# Patient Record
Sex: FEMALE | Race: OTHER | HISPANIC OR LATINO | URBAN - METROPOLITAN AREA
[De-identification: names, ages, dates, MRNs, and addresses within clinical notes are randomized per-mention and may not be internally consistent; named-entity substitution may affect disease eponyms.]

---

## 2019-03-08 ENCOUNTER — INPATIENT (INPATIENT)
Facility: HOSPITAL | Age: 59
LOS: 1 days | Discharge: HOME | End: 2019-03-10
Attending: STUDENT IN AN ORGANIZED HEALTH CARE EDUCATION/TRAINING PROGRAM | Admitting: STUDENT IN AN ORGANIZED HEALTH CARE EDUCATION/TRAINING PROGRAM
Payer: MEDICARE

## 2019-03-08 VITALS
OXYGEN SATURATION: 95 % | DIASTOLIC BLOOD PRESSURE: 97 MMHG | HEART RATE: 105 BPM | SYSTOLIC BLOOD PRESSURE: 154 MMHG | TEMPERATURE: 98 F | RESPIRATION RATE: 24 BRPM

## 2019-03-08 LAB
ALBUMIN SERPL ELPH-MCNC: 4.5 G/DL — SIGNIFICANT CHANGE UP (ref 3.3–5)
ALP SERPL-CCNC: 89 U/L — SIGNIFICANT CHANGE UP (ref 40–120)
ALT FLD-CCNC: 23 U/L — SIGNIFICANT CHANGE UP (ref 4–33)
ANION GAP SERPL CALC-SCNC: 14 MMO/L — SIGNIFICANT CHANGE UP (ref 7–14)
AST SERPL-CCNC: 28 U/L — SIGNIFICANT CHANGE UP (ref 4–32)
BASE EXCESS BLDV CALC-SCNC: 4.3 MMOL/L — SIGNIFICANT CHANGE UP
BASOPHILS # BLD AUTO: 0.03 K/UL — SIGNIFICANT CHANGE UP (ref 0–0.2)
BASOPHILS NFR BLD AUTO: 0.5 % — SIGNIFICANT CHANGE UP (ref 0–2)
BILIRUB SERPL-MCNC: 0.4 MG/DL — SIGNIFICANT CHANGE UP (ref 0.2–1.2)
BLOOD GAS VENOUS - CREATININE: 0.53 MG/DL — SIGNIFICANT CHANGE UP (ref 0.5–1.3)
BUN SERPL-MCNC: 13 MG/DL — SIGNIFICANT CHANGE UP (ref 7–23)
CALCIUM SERPL-MCNC: 8.7 MG/DL — SIGNIFICANT CHANGE UP (ref 8.4–10.5)
CHLORIDE BLDV-SCNC: 109 MMOL/L — HIGH (ref 96–108)
CHLORIDE SERPL-SCNC: 102 MMOL/L — SIGNIFICANT CHANGE UP (ref 98–107)
CO2 SERPL-SCNC: 27 MMOL/L — SIGNIFICANT CHANGE UP (ref 22–31)
CREAT SERPL-MCNC: 0.61 MG/DL — SIGNIFICANT CHANGE UP (ref 0.5–1.3)
EOSINOPHIL # BLD AUTO: 0.3 K/UL — SIGNIFICANT CHANGE UP (ref 0–0.5)
EOSINOPHIL NFR BLD AUTO: 4.9 % — SIGNIFICANT CHANGE UP (ref 0–6)
GAS PNL BLDV: 140 MMOL/L — SIGNIFICANT CHANGE UP (ref 136–146)
GLUCOSE BLDV-MCNC: 100 — HIGH (ref 70–99)
GLUCOSE SERPL-MCNC: 100 MG/DL — HIGH (ref 70–99)
HCO3 BLDV-SCNC: 28 MMOL/L — HIGH (ref 20–27)
HCT VFR BLD CALC: 40.5 % — SIGNIFICANT CHANGE UP (ref 34.5–45)
HCT VFR BLDV CALC: 42.2 % — SIGNIFICANT CHANGE UP (ref 34.5–45)
HGB BLD-MCNC: 13.1 G/DL — SIGNIFICANT CHANGE UP (ref 11.5–15.5)
HGB BLDV-MCNC: 13.8 G/DL — SIGNIFICANT CHANGE UP (ref 11.5–15.5)
IMM GRANULOCYTES NFR BLD AUTO: 0.3 % — SIGNIFICANT CHANGE UP (ref 0–1.5)
LACTATE BLDV-MCNC: 2.1 MMOL/L — HIGH (ref 0.5–2)
LYMPHOCYTES # BLD AUTO: 1.81 K/UL — SIGNIFICANT CHANGE UP (ref 1–3.3)
LYMPHOCYTES # BLD AUTO: 29.5 % — SIGNIFICANT CHANGE UP (ref 13–44)
MCHC RBC-ENTMCNC: 28.8 PG — SIGNIFICANT CHANGE UP (ref 27–34)
MCHC RBC-ENTMCNC: 32.3 % — SIGNIFICANT CHANGE UP (ref 32–36)
MCV RBC AUTO: 89 FL — SIGNIFICANT CHANGE UP (ref 80–100)
MONOCYTES # BLD AUTO: 0.49 K/UL — SIGNIFICANT CHANGE UP (ref 0–0.9)
MONOCYTES NFR BLD AUTO: 8 % — SIGNIFICANT CHANGE UP (ref 2–14)
NEUTROPHILS # BLD AUTO: 3.48 K/UL — SIGNIFICANT CHANGE UP (ref 1.8–7.4)
NEUTROPHILS NFR BLD AUTO: 56.8 % — SIGNIFICANT CHANGE UP (ref 43–77)
NRBC # FLD: 0 K/UL — LOW (ref 25–125)
PCO2 BLDV: 49 MMHG — SIGNIFICANT CHANGE UP (ref 41–51)
PH BLDV: 7.39 PH — SIGNIFICANT CHANGE UP (ref 7.32–7.43)
PLATELET # BLD AUTO: 173 K/UL — SIGNIFICANT CHANGE UP (ref 150–400)
PMV BLD: 11 FL — SIGNIFICANT CHANGE UP (ref 7–13)
PO2 BLDV: 85 MMHG — HIGH (ref 35–40)
POTASSIUM BLDV-SCNC: 3.7 MMOL/L — SIGNIFICANT CHANGE UP (ref 3.4–4.5)
POTASSIUM SERPL-MCNC: 3.8 MMOL/L — SIGNIFICANT CHANGE UP (ref 3.5–5.3)
POTASSIUM SERPL-SCNC: 3.8 MMOL/L — SIGNIFICANT CHANGE UP (ref 3.5–5.3)
PROT SERPL-MCNC: 7.9 G/DL — SIGNIFICANT CHANGE UP (ref 6–8.3)
RBC # BLD: 4.55 M/UL — SIGNIFICANT CHANGE UP (ref 3.8–5.2)
RBC # FLD: 13.2 % — SIGNIFICANT CHANGE UP (ref 10.3–14.5)
SAO2 % BLDV: 95.2 % — HIGH (ref 60–85)
SODIUM SERPL-SCNC: 143 MMOL/L — SIGNIFICANT CHANGE UP (ref 135–145)
TROPONIN T, HIGH SENSITIVITY: 7 NG/L — SIGNIFICANT CHANGE UP (ref ?–14)
WBC # BLD: 6.13 K/UL — SIGNIFICANT CHANGE UP (ref 3.8–10.5)
WBC # FLD AUTO: 6.13 K/UL — SIGNIFICANT CHANGE UP (ref 3.8–10.5)

## 2019-03-08 PROCEDURE — 71046 X-RAY EXAM CHEST 2 VIEWS: CPT | Mod: 26

## 2019-03-08 RX ORDER — IPRATROPIUM/ALBUTEROL SULFATE 18-103MCG
3 AEROSOL WITH ADAPTER (GRAM) INHALATION ONCE
Qty: 0 | Refills: 0 | Status: COMPLETED | OUTPATIENT
Start: 2019-03-08 | End: 2019-03-08

## 2019-03-08 RX ORDER — SODIUM CHLORIDE 9 MG/ML
1000 INJECTION INTRAMUSCULAR; INTRAVENOUS; SUBCUTANEOUS ONCE
Qty: 0 | Refills: 0 | Status: COMPLETED | OUTPATIENT
Start: 2019-03-08 | End: 2019-03-08

## 2019-03-08 RX ORDER — ALBUTEROL 90 UG/1
1 AEROSOL, METERED ORAL EVERY 4 HOURS
Qty: 0 | Refills: 0 | Status: DISCONTINUED | OUTPATIENT
Start: 2019-03-08 | End: 2019-03-09

## 2019-03-08 RX ORDER — TIOTROPIUM BROMIDE 18 UG/1
1 CAPSULE ORAL; RESPIRATORY (INHALATION) DAILY
Qty: 0 | Refills: 0 | Status: DISCONTINUED | OUTPATIENT
Start: 2019-03-08 | End: 2019-03-10

## 2019-03-08 RX ORDER — ONDANSETRON 8 MG/1
4 TABLET, FILM COATED ORAL ONCE
Qty: 0 | Refills: 0 | Status: COMPLETED | OUTPATIENT
Start: 2019-03-08 | End: 2019-03-08

## 2019-03-08 RX ADMIN — Medication 3 MILLILITER(S): at 23:04

## 2019-03-08 NOTE — ED ADULT TRIAGE NOTE - CHIEF COMPLAINT QUOTE
Presents to ED for vomiting.  Patient was treated and released from [facility] today for suicidal thoughts and syncope.  Per daughter, patient was just released from [facility] and immediately brought to [facility] for vomiting, nausea , weakness and left sided chest pain.  Denies SI/HI at this time.  History of depression, ETOH abuse and Asthma.  She consumed 1/2 pint of Bacardi today.  She received weekly injections of Vivitrol.  Appears very uncomfortable and actively vomiting in triage. Presents to ED for SOB and vomiting.  Patient was treated and released from [facility] today for suicidal thoughts and syncope.  Per daughter, patient was just released from [facility] and immediately brought to [facility] for SOB, vomiting, nausea , weakness and left sided chest pain.  Wheezing noted.  Respirations equal but labored and unable to speak in full sentences.  She has been experiencing severe Asthma sx daily without relief with medications x 1wk.  Denies SI/HI at this time.  History of depression, ETOH abuse and Asthma.  She consumed 1/2 pint of Bacardi today and endorse consuming ETOH frequently.  She received weekly injections of Vivitrol.  Appears very uncomfortable and actively vomiting in triage.

## 2019-03-08 NOTE — ED PROVIDER NOTE - CLINICAL SUMMARY MEDICAL DECISION MAKING FREE TEXT BOX
GEOVANNYCANDIE: 59 y/o female BIB her daughter after being discharged from [place] for alcohol intoxication, pt has long standing hx of ETOH abuse, Depression with SI attempts, states that she is on vivitrol but has relapsed past week, drinking everyday does not remember last drink, pt with hx of asthma as well never been intubated but has been hospitalized multiple times for it, today pt syncopized at family's house, pt c/o CP, pt denies drug abuse or taking Xanax, denies SI/HI, on exam pt with swollen eyes, no visual abnormalities, exp wheezing b/l, able to speak, no signs of trauma, pt anxious, tearful,   concern of asthma exacerbation, alcohol withdrawal, syncope/chest pain...1)tele 2)CBC, CMP, CE, VBG, serum tox 3)CXR 4)CIWA score/medicate benzos 5)IVF 6)Duonebs 7)Admit

## 2019-03-08 NOTE — ED PROVIDER NOTE - PROGRESS NOTE DETAILS
Annia Black, resident MD: pt has improvement of wheezing after 1 dose of duoneb but continues to have scattered wheezing. will give another dose of duoneb. pt reports nausea and shaking, concern for withdrawal, will give a dose of ativan at this time. Annia Black, resident MD: pt resting comfortably at this time with no evidence of tremulousness and improvement of palpitations. will admit for concerns of alcohol withdrawal and syncope workup

## 2019-03-08 NOTE — ED PROVIDER NOTE - CARE PLAN
Principal Discharge DX:	ETOH abuse Principal Discharge DX:	ETOH abuse  Secondary Diagnosis:	Asthma  Secondary Diagnosis:	Syncope  Secondary Diagnosis:	Alcohol withdrawal

## 2019-03-08 NOTE — ED PROVIDER NOTE - OBJECTIVE STATEMENT
59 yo woman PMH EtOH abuse, asthma, HTN, depression, CVA with no residual symptoms presents after an episode of loss of consciousness. Pt was at home and daughter was called 57 yo woman PMH EtOH abuse, asthma, HTN, depression, CVA with no residual symptoms presents after an episode of loss of consciousness. Pt was at home and daughter was called as pt had suicidal ideations, she went over to pt's cousin's house where she had an episode of loss of consciousness and EMS was called and pt was taken to [facility]. She was discharged from the ED but pt became nauseous after discharge and was brought to this ED by daughter. Pt also complains of left chest pressure that radiates to her arm as well as SOB consistent with asthma exacerbation. Pt was recently in rehab for EtOH abuse x 1 month and has been on vivitrol but relapsed this past Saturday and had her last drink this morning. Pt denies polysubstance use. Pt denies history of withdrawal or seizures due to EtOH use. 59 yo woman PMH EtOH abuse, asthma, HTN, depression, CVA with no residual symptoms presents after an episode of loss of consciousness. Pt's daughter was called by pt's cousin as pt had suicidal ideations. She had been drinking in the morning and went over to pt's cousin's house where she had an episode of loss of consciousness and EMS was called and pt was taken to [facility]. She was discharged from the ED but pt became nauseous after discharge and was brought to this ED by daughter. Pt currently denies suicidal ideations and reports that she has never had any thoughts of suicide as she is [employer]. Pt also complains of left chest pressure that radiates to her arm as well palpitation and SOB consistent with asthma exacerbation. Pt was recently in rehab for EtOH abuse x 1 month and has been on vivitrol but relapsed this past Saturday and had her last drink this morning. Pt denies polysubstance use. Pt denies history of withdrawal or seizures due to EtOH use. Pt has had previous hospitalizations for asthma but no history of intubations.

## 2019-03-08 NOTE — ED PROVIDER NOTE - NS ED ROS FT
General: no fevers or chills  Head: no headache  Eyes: no vision change  ENT: no neck pain  CV: + left chest pain, + palpitations  Resp: + SOB, no cough  GI: +N/V, no hematemesis  : no dysuria  MSK: +left arm pain  Skin: no new rash  Neuro: no focal weakness, no change in sensation General: no fevers or chills  Head: no headache  Eyes: no vision change  ENT: no neck pain  CV: + left chest pain, + palpitations  Resp: + SOB, +cough  GI: +N/V, no hematemesis  : no dysuria  MSK: +left arm pain  Skin: no new rash  Neuro: no focal weakness, no change in sensation

## 2019-03-08 NOTE — ED PROVIDER NOTE - ATTENDING CONTRIBUTION TO CARE
Attending MD GOULD:  I personally have seen and examined this patient.  Resident note reviewed and agree on plan of care and except where noted.  See MDM for details.

## 2019-03-08 NOTE — ED ADULT NURSE NOTE - CHIEF COMPLAINT QUOTE
Presents to ED for SOB and vomiting.  Patient was treated and released from [facility] today for suicidal thoughts and syncope.  Per daughter, patient was just released from [facility] and immediately brought to [facility] for SOB, vomiting, nausea , weakness and left sided chest pain.  Wheezing noted.  Respirations equal but labored and unable to speak in full sentences.  She has been experiencing severe Asthma sx daily without relief with medications x 1wk.  Denies SI/HI at this time.  History of depression, ETOH abuse and Asthma.  She consumed 1/2 pint of Bacardi today and endorse consuming ETOH frequently.  She received weekly injections of Vivitrol.  Appears very uncomfortable and actively vomiting in triage.

## 2019-03-08 NOTE — ED PROVIDER NOTE - PHYSICAL EXAMINATION
General: anxious-appearing woman in mild to moderate respiratory distress able to speak in sentences  Head: normocephalic, atraumatic  Eyes: PERRL  Mouth: moist mucous membranes  Neck: supple neck, no Cspine tenderness to palpation  CV: normal rate and rhythm at time of exam, normal S1 and S2  Respiratory: wheezing diffusely bilaterally  Abdomen: soft, nontender, nondistended  Back: no midline tenderness to palpation, no CVAT  Neuro: alert and oriented x3, speech clear, moving all extremities spontaneously  Skin: no rash  Extremities: no edema, peripheral pulses 2+ bilaterally

## 2019-03-08 NOTE — ED ADULT NURSE NOTE - OBJECTIVE STATEMENT
Pt is a 58 year old female reporting to the ED for SOB, N/V. Pt reports she was seen at [facility] this morning for fall after drinking 1/2 pint of Bacardi and SI. Pt reports PMh of asthma, Depression and anxiety. Pt reports increased SOB for 1 week. Pt reports medication is no longer helping her SOB. Pt is wheezing, respirations are tachypneic. Pt reports increase n/v after leaving [facility]. Pt reports receiving Vivitrol injections weekly. PT reports increased anxiety and depression. Pt is AOX4, lethargic. Pt reports left side chest pain. Pt deneis fever, chills. MD at bedside, will continue to monitor Pt is a 58 year old female reporting to the ED for SOB, N/V. Pt reports she was seen at [facility] this morning for fall after drinking 1/2 pint of Bacardi and SI. Pt reports PMh of asthma, Depression and anxiety. Pt reports increased SOB for 1 week. Pt reports medication is no longer helping her SOB. Pt is wheezing, respirations are tachypneic. Pt reports increase n/v after leaving [place]. Pt reports receiving Vivitrol injections weekly. PT reports increased anxiety and depression. Pt is AOX4, lethargic. Pt denies SI/HI. Pt reports left side chest pain. Pt deneis fever, chills. MD at bedside, will continue to monitor

## 2019-03-09 DIAGNOSIS — R07.9 CHEST PAIN, UNSPECIFIED: ICD-10-CM

## 2019-03-09 DIAGNOSIS — F10.10 ALCOHOL ABUSE, UNCOMPLICATED: ICD-10-CM

## 2019-03-09 DIAGNOSIS — J45.909 UNSPECIFIED ASTHMA, UNCOMPLICATED: ICD-10-CM

## 2019-03-09 DIAGNOSIS — I10 ESSENTIAL (PRIMARY) HYPERTENSION: ICD-10-CM

## 2019-03-09 DIAGNOSIS — F10.239 ALCOHOL DEPENDENCE WITH WITHDRAWAL, UNSPECIFIED: ICD-10-CM

## 2019-03-09 LAB
AMPHET UR-MCNC: NEGATIVE — SIGNIFICANT CHANGE UP
APAP SERPL-MCNC: < 15 UG/ML — LOW (ref 15–25)
BARBITURATES UR SCN-MCNC: NEGATIVE — SIGNIFICANT CHANGE UP
BENZODIAZ UR-MCNC: NEGATIVE — SIGNIFICANT CHANGE UP
CANNABINOIDS UR-MCNC: NEGATIVE — SIGNIFICANT CHANGE UP
COCAINE METAB.OTHER UR-MCNC: NEGATIVE — SIGNIFICANT CHANGE UP
ETHANOL BLD-MCNC: 94 MG/DL — HIGH
MAGNESIUM SERPL-MCNC: 1.7 MG/DL — SIGNIFICANT CHANGE UP (ref 1.6–2.6)
METHADONE UR-MCNC: NEGATIVE — SIGNIFICANT CHANGE UP
OPIATES UR-MCNC: NEGATIVE — SIGNIFICANT CHANGE UP
OXYCODONE UR-MCNC: NEGATIVE — SIGNIFICANT CHANGE UP
PCP UR-MCNC: NEGATIVE — SIGNIFICANT CHANGE UP
PHOSPHATE SERPL-MCNC: 3 MG/DL — SIGNIFICANT CHANGE UP (ref 2.5–4.5)
SALICYLATES SERPL-MCNC: < 5 MG/DL — LOW (ref 15–30)
TROPONIN T, HIGH SENSITIVITY: 6 NG/L — SIGNIFICANT CHANGE UP (ref ?–14)

## 2019-03-09 PROCEDURE — 99223 1ST HOSP IP/OBS HIGH 75: CPT

## 2019-03-09 RX ORDER — DIAZEPAM 5 MG
5 TABLET ORAL EVERY 8 HOURS
Qty: 0 | Refills: 0 | Status: DISCONTINUED | OUTPATIENT
Start: 2019-03-09 | End: 2019-03-09

## 2019-03-09 RX ORDER — ACETAMINOPHEN 500 MG
650 TABLET ORAL ONCE
Qty: 0 | Refills: 0 | Status: DISCONTINUED | OUTPATIENT
Start: 2019-03-09 | End: 2019-03-10

## 2019-03-09 RX ORDER — MONTELUKAST 4 MG/1
10 TABLET, CHEWABLE ORAL DAILY
Qty: 0 | Refills: 0 | Status: DISCONTINUED | OUTPATIENT
Start: 2019-03-09 | End: 2019-03-10

## 2019-03-09 RX ORDER — ALBUTEROL 90 UG/1
2.5 AEROSOL, METERED ORAL EVERY 6 HOURS
Qty: 0 | Refills: 0 | Status: DISCONTINUED | OUTPATIENT
Start: 2019-03-09 | End: 2019-03-10

## 2019-03-09 RX ORDER — HEPARIN SODIUM 5000 [USP'U]/ML
5000 INJECTION INTRAVENOUS; SUBCUTANEOUS EVERY 8 HOURS
Qty: 0 | Refills: 0 | Status: DISCONTINUED | OUTPATIENT
Start: 2019-03-09 | End: 2019-03-10

## 2019-03-09 RX ORDER — ALBUTEROL 90 UG/1
1 AEROSOL, METERED ORAL EVERY 4 HOURS
Qty: 0 | Refills: 0 | Status: DISCONTINUED | OUTPATIENT
Start: 2019-03-09 | End: 2019-03-10

## 2019-03-09 RX ORDER — IPRATROPIUM/ALBUTEROL SULFATE 18-103MCG
3 AEROSOL WITH ADAPTER (GRAM) INHALATION ONCE
Qty: 0 | Refills: 0 | Status: COMPLETED | OUTPATIENT
Start: 2019-03-09 | End: 2019-03-09

## 2019-03-09 RX ORDER — HYDROCHLOROTHIAZIDE 25 MG
25 TABLET ORAL DAILY
Qty: 0 | Refills: 0 | Status: DISCONTINUED | OUTPATIENT
Start: 2019-03-09 | End: 2019-03-10

## 2019-03-09 RX ORDER — NIFEDIPINE 30 MG
30 TABLET, EXTENDED RELEASE 24 HR ORAL DAILY
Qty: 0 | Refills: 0 | Status: DISCONTINUED | OUTPATIENT
Start: 2019-03-09 | End: 2019-03-10

## 2019-03-09 RX ADMIN — MONTELUKAST 10 MILLIGRAM(S): 4 TABLET, CHEWABLE ORAL at 11:41

## 2019-03-09 RX ADMIN — Medication 3 MILLILITER(S): at 05:17

## 2019-03-09 RX ADMIN — Medication 3 MILLILITER(S): at 00:07

## 2019-03-09 RX ADMIN — ONDANSETRON 4 MILLIGRAM(S): 8 TABLET, FILM COATED ORAL at 00:07

## 2019-03-09 RX ADMIN — ALBUTEROL 2.5 MILLIGRAM(S): 90 AEROSOL, METERED ORAL at 11:43

## 2019-03-09 RX ADMIN — Medication 30 MILLIGRAM(S): at 11:41

## 2019-03-09 RX ADMIN — ALBUTEROL 2.5 MILLIGRAM(S): 90 AEROSOL, METERED ORAL at 21:51

## 2019-03-09 RX ADMIN — ALBUTEROL 2.5 MILLIGRAM(S): 90 AEROSOL, METERED ORAL at 18:00

## 2019-03-09 RX ADMIN — Medication 1 MILLIGRAM(S): at 00:07

## 2019-03-09 RX ADMIN — SODIUM CHLORIDE 2000 MILLILITER(S): 9 INJECTION INTRAMUSCULAR; INTRAVENOUS; SUBCUTANEOUS at 00:08

## 2019-03-09 RX ADMIN — Medication 25 MILLIGRAM(S): at 11:41

## 2019-03-09 NOTE — H&P ADULT - ATTENDING COMMENTS
58F with PMHx anxiety, asthma, etoh abuse presented after episode of syncope related to EtOH abuse at home. Also reporting anxiety attack with substernal chest pain which now resolved. No ST wave changes on EKG, trop neg x2. Syncope likely related to EtOH use, though could not rule-out cardiac cause, no murmurs heard on exam. c/w tele monitor x24hrs. TTE. no need for stress test. Asthma, mild persistent, c/w Symbicort and singular, albuterol prn. EtOH abuse, monitor CIWA, c/w home med for anxiety, need outpatient psych follow up. BZD prn for etoh withdrawal. if not tele event overnight and does not go into acute etoh withdrawal, can be d/c in AM.

## 2019-03-09 NOTE — ED ADULT NURSE REASSESSMENT NOTE - NS ED NURSE REASSESS COMMENT FT1
Pt is AOX4. Pt is wheezing on inspiration and expiration. Pt receiving duoneb treatment, will continue to monitor

## 2019-03-09 NOTE — H&P ADULT - HISTORY OF PRESENT ILLNESS
This is a 59 yo F c/o nausea after going on a binge drinking episode last night . She passed out and EMS was called and took her to [facility], she remembers being taken to the ambulance and was feeling CP at that time states she normally gets CP when she gets panic attacks, and she felt as if she was having a panic attack in the ambulance. The CP was substernal non radiating. She often has CP at night in bed when she becomes anxious or has a panic attack, she normally takes diazepam but was recently started on xanax by her psychiatrist. Patient denies any SOB, MCCORMICK, orthopnea, no CP with exertion.   	She had about a half a bottle of rum yesterday, prior she was doing well was on ETOH rehab and went 1 month without a drink and was on vivitrol.  She  use to have 2 episodes of binge drinking a month. She has no intention to hurt herself or others, no suicidal thoughts or ideation. She was discharged from [facility] and  was nausea and had an episode of vomitting so her daughter brought her here straight from [facility]. This is a 59 yo F c/o nausea after going on a binge drinking episode last night . She passed out and EMS was called and took her to [facility], she remembers being taken to the ambulance and was feeling CP at that time states she normally gets CP when she gets panic attacks, and she felt as if she was having a panic attack in the ambulance. The CP was substernal non radiating. She often has CP at night in bed when she becomes anxious or has a panic attack, she normally takes diazepam but was recently started on xanax by her psychiatrist. Patient denies any SOB, MCCORMICK, orthopnea, no CP with exertion.   	She had about a half a bottle of rum yesterday, prior she was doing well was on ETOH rehab and went 1 month without a drink and was on vivitrol.  She  use to have 2 episodes of binge drinking a month. She has no intention to hurt herself or others, no suicidal thoughts or ideation. She was discharged from [place] and  was nausea and had an episode of vomitting so her daughter brought her here straight from [facility]. No fever, chills, no abd pain.

## 2019-03-09 NOTE — H&P ADULT - NSHPLABSRESULTS_GEN_ALL_CORE
13.1   6.13  )-----------( 173      ( 08 Mar 2019 22:50 )             40.5   03-08    143  |  102  |  13  ----------------------------<  100<H>  3.8   |  27  |  0.61    Ca    8.7      08 Mar 2019 22:50  Phos  3.0     03-08  Mg     1.7     03-08    TPro  7.9  /  Alb  4.5  /  TBili  0.4  /  DBili  x   /  AST  28  /  ALT  23  /  AlkPhos  89  03-08

## 2019-03-10 VITALS
TEMPERATURE: 98 F | DIASTOLIC BLOOD PRESSURE: 67 MMHG | SYSTOLIC BLOOD PRESSURE: 112 MMHG | HEART RATE: 78 BPM | OXYGEN SATURATION: 98 % | RESPIRATION RATE: 18 BRPM

## 2019-03-10 DIAGNOSIS — F41.9 ANXIETY DISORDER, UNSPECIFIED: ICD-10-CM

## 2019-03-10 DIAGNOSIS — M54.30 SCIATICA, UNSPECIFIED SIDE: ICD-10-CM

## 2019-03-10 LAB
ANION GAP SERPL CALC-SCNC: 15 MMO/L — HIGH (ref 7–14)
BUN SERPL-MCNC: 12 MG/DL — SIGNIFICANT CHANGE UP (ref 7–23)
CALCIUM SERPL-MCNC: 9.8 MG/DL — SIGNIFICANT CHANGE UP (ref 8.4–10.5)
CHLORIDE SERPL-SCNC: 97 MMOL/L — LOW (ref 98–107)
CO2 SERPL-SCNC: 27 MMOL/L — SIGNIFICANT CHANGE UP (ref 22–31)
CREAT SERPL-MCNC: 0.65 MG/DL — SIGNIFICANT CHANGE UP (ref 0.5–1.3)
GLUCOSE SERPL-MCNC: 102 MG/DL — HIGH (ref 70–99)
HCT VFR BLD CALC: 42.4 % — SIGNIFICANT CHANGE UP (ref 34.5–45)
HGB BLD-MCNC: 13.9 G/DL — SIGNIFICANT CHANGE UP (ref 11.5–15.5)
MAGNESIUM SERPL-MCNC: 1.9 MG/DL — SIGNIFICANT CHANGE UP (ref 1.6–2.6)
MCHC RBC-ENTMCNC: 28.8 PG — SIGNIFICANT CHANGE UP (ref 27–34)
MCHC RBC-ENTMCNC: 32.8 % — SIGNIFICANT CHANGE UP (ref 32–36)
MCV RBC AUTO: 88 FL — SIGNIFICANT CHANGE UP (ref 80–100)
NRBC # FLD: 0 K/UL — LOW (ref 25–125)
PLATELET # BLD AUTO: 150 K/UL — SIGNIFICANT CHANGE UP (ref 150–400)
PMV BLD: 11.4 FL — SIGNIFICANT CHANGE UP (ref 7–13)
POTASSIUM SERPL-MCNC: 3.5 MMOL/L — SIGNIFICANT CHANGE UP (ref 3.5–5.3)
POTASSIUM SERPL-SCNC: 3.5 MMOL/L — SIGNIFICANT CHANGE UP (ref 3.5–5.3)
RBC # BLD: 4.82 M/UL — SIGNIFICANT CHANGE UP (ref 3.8–5.2)
RBC # FLD: 13 % — SIGNIFICANT CHANGE UP (ref 10.3–14.5)
SODIUM SERPL-SCNC: 139 MMOL/L — SIGNIFICANT CHANGE UP (ref 135–145)
WBC # BLD: 5.33 K/UL — SIGNIFICANT CHANGE UP (ref 3.8–10.5)
WBC # FLD AUTO: 5.33 K/UL — SIGNIFICANT CHANGE UP (ref 3.8–10.5)

## 2019-03-10 PROCEDURE — 99239 HOSP IP/OBS DSCHRG MGMT >30: CPT

## 2019-03-10 RX ORDER — ACETAMINOPHEN 500 MG
2 TABLET ORAL
Qty: 0 | Refills: 0 | COMMUNITY
Start: 2019-03-10

## 2019-03-10 RX ORDER — ESCITALOPRAM OXALATE 10 MG/1
1 TABLET, FILM COATED ORAL
Qty: 3 | Refills: 0 | OUTPATIENT
Start: 2019-03-10 | End: 2019-03-12

## 2019-03-10 RX ORDER — NIFEDIPINE 30 MG
30 TABLET, EXTENDED RELEASE 24 HR ORAL
Qty: 900 | Refills: 0 | OUTPATIENT
Start: 2019-03-10 | End: 2019-04-08

## 2019-03-10 RX ORDER — ALPRAZOLAM 0.25 MG
1 TABLET ORAL
Qty: 3 | Refills: 0 | OUTPATIENT
Start: 2019-03-10 | End: 2019-03-10

## 2019-03-10 RX ORDER — MONTELUKAST 4 MG/1
1 TABLET, CHEWABLE ORAL
Qty: 0 | Refills: 0 | COMMUNITY
Start: 2019-03-10

## 2019-03-10 RX ORDER — ALBUTEROL 90 UG/1
90 AEROSOL, METERED ORAL
Qty: 0 | Refills: 0 | COMMUNITY

## 2019-03-10 RX ORDER — ESCITALOPRAM OXALATE 10 MG/1
1 TABLET, FILM COATED ORAL
Qty: 0 | Refills: 0 | COMMUNITY

## 2019-03-10 RX ORDER — ALPRAZOLAM 0.25 MG
1 TABLET ORAL
Qty: 0 | Refills: 0 | COMMUNITY

## 2019-03-10 RX ADMIN — ALBUTEROL 2.5 MILLIGRAM(S): 90 AEROSOL, METERED ORAL at 04:48

## 2019-03-10 RX ADMIN — Medication 25 MILLIGRAM(S): at 05:31

## 2019-03-10 RX ADMIN — ALBUTEROL 2.5 MILLIGRAM(S): 90 AEROSOL, METERED ORAL at 16:46

## 2019-03-10 RX ADMIN — ALBUTEROL 2.5 MILLIGRAM(S): 90 AEROSOL, METERED ORAL at 10:17

## 2019-03-10 RX ADMIN — Medication 30 MILLIGRAM(S): at 05:31

## 2019-03-10 RX ADMIN — MONTELUKAST 10 MILLIGRAM(S): 4 TABLET, CHEWABLE ORAL at 09:22

## 2019-03-10 NOTE — H&P ADULT - [facility] HE PE MLT D E PC
Nausea, vomiting, diarrhea / discharge instructions    -Drink plenty of fluids. Drink sips of the oral dehydration every 5 minutes until urination becomes normal. (It's normal to urinate four or five times a day.) Adults and large children should drink at least 3 quarts or litres of oral fluids a day until they are well.    -Eat foods that are dry such as toast, crackers, or rice. Other diet recommendations: Burke diet, apple sauce, bananas, cereal, oatmeal, soups ok, no milk products until symptoms resolve. -Your symptoms should be resolved in 2-3 days    -Monitor for fevers, chills, increase in abdominal pain, or blood in stool.      -Follow up with your primary provider in 3-5 days or sooner as needed. regular rate and rhythm

## 2019-03-10 NOTE — PROGRESS NOTE ADULT - PROBLEM SELECTOR PLAN 1
likely from panic attack and/or dyspepsia for alcohol binge. No signs or symptoms of heart failure noted. EKG without findings c/w ischemic heart disease  - no need for further inpatient cardiac w/u at this time  - can f/u with outpatient PMD

## 2019-03-10 NOTE — PROGRESS NOTE ADULT - PROBLEM SELECTOR PLAN 6
- continue home regimen of gabapentin    DISPO: Patient is medically stable to be discharged home today with close outpatient f/u. Spent 35 min coordinating discharge plan and counseling patient on her condition and care.

## 2019-03-10 NOTE — DISCHARGE NOTE PROVIDER - CARE PROVIDER_API CALL
Your PMD,   Phone: (   )    -  Fax: (   )    -  Follow Up Time: Your PMD,   Phone: (   )    -  Fax: (   )    -  Follow Up Time:     Yoana Bennett  Phone: [phone]  Fax: (   )    -  Follow Up Time:

## 2019-03-10 NOTE — PROGRESS NOTE ADULT - PROBLEM SELECTOR PLAN 2
Not in active alcohol withdrawal. Patient admits to relapse due to psychosocial factors. Agreeable to f/u with her outpatient psychiatrist.

## 2019-03-10 NOTE — PROGRESS NOTE ADULT - SUBJECTIVE AND OBJECTIVE BOX
Patient is a 58y old  Female who presents with a chief complaint of nausea and CP (10 Mar 2019 11:50)        SUBJECTIVE / OVERNIGHT EVENTS:      MEDICATIONS  (STANDING):  ALBUTerol    0.083% 2.5 milliGRAM(s) Nebulizer every 6 hours  ALBUTerol    90 MICROgram(s) HFA Inhaler 1 Puff(s) Inhalation every 4 hours  heparin  Injectable 5000 Unit(s) SubCutaneous every 8 hours  hydrochlorothiazide 25 milliGRAM(s) Oral daily  montelukast 10 milliGRAM(s) Oral daily  NIFEdipine XL 30 milliGRAM(s) Oral daily  tiotropium 18 MICROgram(s) Capsule 1 Capsule(s) Inhalation daily    MEDICATIONS  (PRN):  acetaminophen   Tablet .. 650 milliGRAM(s) Oral once PRN Moderate Pain (4 - 6), Severe Pain (7 - 10)  LORazepam     Tablet 0.5 milliGRAM(s) Oral three times a day PRN Anxiety      Vital Signs Last 24 Hrs  T(C): 36.7 (10 Mar 2019 12:00), Max: 36.9 (09 Mar 2019 19:05)  T(F): 98 (10 Mar 2019 12:00), Max: 98.5 (09 Mar 2019 19:05)  HR: 70 (10 Mar 2019 12:00) (66 - 95)  BP: 114/89 (10 Mar 2019 12:00) (114/89 - 153/87)  BP(mean): --  RR: 18 (10 Mar 2019 12:00) (16 - 18)  SpO2: 98% (10 Mar 2019 12:00) (96% - 100%)  CAPILLARY BLOOD GLUCOSE        I&O's Summary    09 Mar 2019 06:01  -  10 Mar 2019 07:00  --------------------------------------------------------  IN: 400 mL / OUT: 0 mL / NET: 400 mL    10 Mar 2019 07:01  -  10 Mar 2019 16:13  --------------------------------------------------------  IN: 600 mL / OUT: 0 mL / NET: 600 mL          PHYSICAL EXAM  GENERAL: NAD, well-developed  HEAD:  Atraumatic, Normocephalic  EYES: EOMI, PERRLA, conjunctiva and sclera clear  NECK: Supple, No JVD  CHEST/LUNG: Clear to auscultation bilaterally; No wheeze  HEART: Regular rate and rhythm; No murmurs, rubs, or gallops  ABDOMEN: Soft, Nontender, Nondistended; Bowel sounds present  EXTREMITIES:  2+ Peripheral Pulses, No clubbing, cyanosis, or edema  PSYCH: AAOx3  SKIN: No rashes or lesions    LABS:                        13.9   5.33  )-----------( 150      ( 10 Mar 2019 07:16 )             42.4     03-10    139  |  97<L>  |  12  ----------------------------<  102<H>  3.5   |  27  |  0.65    Ca    9.8      10 Mar 2019 07:16  Phos  3.0     03-08  Mg     1.9     03-10    TPro  7.9  /  Alb  4.5  /  TBili  0.4  /  DBili  x   /  AST  28  /  ALT  23  /  AlkPhos  89  03-08              RADIOLOGY & ADDITIONAL TESTS:    Imaging Personally Reviewed:  Consultant(s) Notes Reviewed:    Care Discussed with Consultants/Other Providers: Patient is a 58y old  Female who presents with a chief complaint of nausea and CP (10 Mar 2019 11:50)    SUBJECTIVE / OVERNIGHT EVENTS:  No events overnight. Patient is doing well but anxious, particularly after hearing "code blue" happening on the floor. She denies current chest pain or chest pain on exertion. She has no SOB, abdominal pain, or LE swelling. CIWA score 0 -2 while inpatient.     MEDICATIONS  (STANDING):  ALBUTerol    0.083% 2.5 milliGRAM(s) Nebulizer every 6 hours  ALBUTerol    90 MICROgram(s) HFA Inhaler 1 Puff(s) Inhalation every 4 hours  heparin  Injectable 5000 Unit(s) SubCutaneous every 8 hours  hydrochlorothiazide 25 milliGRAM(s) Oral daily  montelukast 10 milliGRAM(s) Oral daily  NIFEdipine XL 30 milliGRAM(s) Oral daily  tiotropium 18 MICROgram(s) Capsule 1 Capsule(s) Inhalation daily    MEDICATIONS  (PRN):  acetaminophen   Tablet .. 650 milliGRAM(s) Oral once PRN Moderate Pain (4 - 6), Severe Pain (7 - 10)  LORazepam     Tablet 0.5 milliGRAM(s) Oral three times a day PRN Anxiety      Vital Signs Last 24 Hrs  T(C): 36.7 (10 Mar 2019 12:00), Max: 36.9 (09 Mar 2019 19:05)  T(F): 98 (10 Mar 2019 12:00), Max: 98.5 (09 Mar 2019 19:05)  HR: 70 (10 Mar 2019 12:00) (66 - 95)  BP: 114/89 (10 Mar 2019 12:00) (114/89 - 153/87)  BP(mean): --  RR: 18 (10 Mar 2019 12:00) (16 - 18)  SpO2: 98% (10 Mar 2019 12:00) (96% - 100%)        I&O's Summary    09 Mar 2019 06:01  -  10 Mar 2019 07:00  --------------------------------------------------------  IN: 400 mL / OUT: 0 mL / NET: 400 mL    10 Mar 2019 07:01  -  10 Mar 2019 16:13  --------------------------------------------------------  IN: 600 mL / OUT: 0 mL / NET: 600 mL          PHYSICAL EXAM  GENERAL: NAD, well-developed  CHEST/LUNG: Clear to auscultation bilaterally; No wheeze  HEART: Regular rate and rhythm; No murmurs, rubs, or gallops  ABDOMEN: Soft, Nontender, Nondistended; Bowel sounds present  EXTREMITIES:  2+ Peripheral Pulses, No clubbing, cyanosis, or edema  PSYCH: AAOx3, anxious but pleasant.       LABS:                        13.9   5.33  )-----------( 150      ( 10 Mar 2019 07:16 )             42.4     03-10    139  |  97<L>  |  12  ----------------------------<  102<H>  3.5   |  27  |  0.65    Ca    9.8      10 Mar 2019 07:16  Mg     1.9     03-10      Consultant(s) Notes Reviewed:    Care Discussed with Consultants/Other Providers:

## 2019-03-10 NOTE — DISCHARGE NOTE PROVIDER - NSDCCPCAREPLAN_GEN_ALL_CORE_FT
PRINCIPAL DISCHARGE DIAGNOSIS  Problem: Chest pain  Assessment and Plan of Treatment: Likely secondary to anxiety  Follow up with Psych as outpatient      SECONDARY DISCHARGE DIAGNOSES  Problem: ETOH abuse  Assessment and Plan of Treatment: Attend alcoholism treatment program, practice the Twelve Steps of AA.  Go to meetings to help you cope with uncomfortable feelings, and to help you develop a relapse prevention plan to prevent complications associated with alcohol intoxication.      Problem: Essential hypertension  Assessment and Plan of Treatment: Low sodium and fat diet, continue anti-hypertensive medications, and follow up with primary care physician.      Problem: Anxiety  Assessment and Plan of Treatment: Follow up with Psychiatry as outpatient    Problem: Asthma  Assessment and Plan of Treatment: Continue current medications as prescribed.  Avoid exposures to environmental allergens such as carpets, pets and both first-hand and second-hand smoking.  During seasonal allergy period, take a shower as soon as you get home and change your clothes immediately.  Follow up your routine medical appointments.

## 2019-03-10 NOTE — DISCHARGE NOTE PROVIDER - PROVIDER TOKENS
FREE:[LAST:[Your PMD],PHONE:[(   )    -],FAX:[(   )    -]] FREE:[LAST:[Your PMD],PHONE:[(   )    -],FAX:[(   )    -]],FREE:[LAST:[Donald],FIRST:[Yoana],PHONE:[phone]],FAX:[(   )    -]]

## 2019-03-10 NOTE — DISCHARGE NOTE PROVIDER - HOSPITAL COURSE
This is a 64  admitted with nausea and CP after episode of binge ETOH drinking          Problem/Plan - 1:    ·  Problem: Chest pain, unspecified type.  Plan: likely from panic attack     Pt was monitored on tele , No event recorded    S/P Echo: ------------------------------------------         Problem/Plan - 2:    ·  Problem: ETOH abuse. Can follow as outpatient         Problem/Plan - 3:    ·  Problem: Asthma, unspecified asthma severity, unspecified whether complicated, unspecified whether persistent.  Plan: ALBUTEROL NEB --> mdi    singulair.          Problem/Plan - 4:    ·  Problem: Essential hypertension.  Plan: nifedipene.     case discussed with attending, Pt is stable for Discharge Home This is a 64  admitted with nausea and CP after episode of binge ETOH drinking          Problem/Plan - 1:    ·  Problem: Chest pain, unspecified type.  Plan: likely from panic attack     Pt was monitored on tele , No event recorded         Problem/Plan - 2:    ·  Problem: ETOH abuse. Can follow as outpatient         Problem/Plan - 3:    ·  Problem: Asthma, unspecified asthma severity, unspecified whether complicated, unspecified whether persistent.  Plan: ALBUTEROL NEB --> mdi    singulair.          Problem/Plan - 4:    ·  Problem: Essential hypertension.  Plan: nifedipene.     case discussed with attending, Pt is stable for Discharge Home This is a 58 year old female with a PMhx of depression, anxiety and alcohol abuse disorder admitted with nausea and CP after episode of binge ETOH drinking. However, chest pain now resolved and acute cardiac ischemia rule out.   Chest Pain is likely from panic attack and/or dyspepsia for alcohol binge. No signs or symptoms of heart failure noted. EKG without findings c/w ischemic heart disease, Pt was monitored on tele , No event recorded no need for further inpatient cardiac work up at this time.  Continue Albuterol and singulair for Asthma, continue nifedipine for hypertension.  Can follow up with outpatient PMD, Dr. Donald Lees.      Case discussed with attending, Pt is stable for Discharge Home

## 2019-03-10 NOTE — PROGRESS NOTE ADULT - PROBLEM SELECTOR PLAN 4
Controlled without acute exacerbation currently  - continue outpatient therapy of Singular and albuterol inhaler PRN

## 2019-03-10 NOTE — DISCHARGE NOTE NURSING/CASE MANAGEMENT/SOCIAL WORK - NSDCDPATPORTLINK_GEN_ALL_CORE
You can access the [facility] Portal, offered by [facility], by registering with the following website: [email or web address]

## 2019-03-10 NOTE — PROGRESS NOTE ADULT - ASSESSMENT
Ms. Alexander is a 65 yo woman with hx of depression, anxiety and alcohol abuse disorder admitted with nausea and CP after episode of binge ETOH drinking. However, chest pain now resolved and acute cardiac ischemia r/o.

## 2019-03-10 NOTE — PROGRESS NOTE ADULT - PROBLEM SELECTOR PLAN 3
Chronic. Followed by psychiatry and PMD. Patient on lexapro 20mg at home. Patient also prescribed Xanax 0.5mg PRN by PMD, Dr. Donald Lees. ISTOP performed reference #[id number]. Pt last dispensed Xanax on 2/13 40 pills (10-day supply). However, pt reports she rarely takes any pills   - continue home regimen of Lexapro  - pt to f/u with outpatient psych. Patient states she has an upcoming appt on 3/14/19  - counseled pt on recommendation not to take benzos (Xanax) while drinking due to adverse effects when combined together. Pt expressed understanding. Also informed pt she needs to talk with her outpatient providers of alternative meds beside benzo to treat anxiety/panic attacks given hx of alcohol abuse. Pt states she has been educated when to take Xanax appropriately for her panic attacks

## 2019-03-11 RX ORDER — ALPRAZOLAM 0.25 MG
1 TABLET ORAL
Qty: 3 | Refills: 0 | OUTPATIENT
Start: 2019-03-11 | End: 2019-03-11

## 2022-06-30 NOTE — PATIENT PROFILE ADULT - NSPROPOAPRESSUREINJURY_GEN_A_NUR
Patient scheduled for surgical procedure and pre-admit per Dr. Huston. Pre-admit discussed in clinic, patient given hibiclens. Patient given blue folder containing all pre-op, john-op, and post op instructions.   no

## 2024-04-29 ENCOUNTER — EMERGENCY (EMERGENCY)
Facility: HOSPITAL | Age: 64
LOS: 1 days | Discharge: HOME | End: 2024-04-29
Attending: STUDENT IN AN ORGANIZED HEALTH CARE EDUCATION/TRAINING PROGRAM | Admitting: STUDENT IN AN ORGANIZED HEALTH CARE EDUCATION/TRAINING PROGRAM
Payer: COMMERCIAL

## 2024-04-29 VITALS
WEIGHT: 156.97 LBS | OXYGEN SATURATION: 98 % | RESPIRATION RATE: 16 BRPM | HEART RATE: 71 BPM | TEMPERATURE: 98 F | DIASTOLIC BLOOD PRESSURE: 79 MMHG | SYSTOLIC BLOOD PRESSURE: 119 MMHG | HEIGHT: 59 IN

## 2024-04-29 VITALS
HEART RATE: 76 BPM | RESPIRATION RATE: 18 BRPM | DIASTOLIC BLOOD PRESSURE: 85 MMHG | TEMPERATURE: 98 F | OXYGEN SATURATION: 96 % | SYSTOLIC BLOOD PRESSURE: 133 MMHG

## 2024-04-29 LAB
ALBUMIN SERPL ELPH-MCNC: 3.5 G/DL — SIGNIFICANT CHANGE UP (ref 3.3–5)
ALP SERPL-CCNC: 79 U/L — SIGNIFICANT CHANGE UP (ref 40–120)
ALT FLD-CCNC: 62 U/L — SIGNIFICANT CHANGE UP (ref 12–78)
ANION GAP SERPL CALC-SCNC: 6 MMOL/L — SIGNIFICANT CHANGE UP (ref 5–17)
APTT BLD: 29.4 SEC — SIGNIFICANT CHANGE UP (ref 24.5–35.6)
AST SERPL-CCNC: 42 U/L — HIGH (ref 15–37)
BASOPHILS # BLD AUTO: 0.02 K/UL — SIGNIFICANT CHANGE UP (ref 0–0.2)
BASOPHILS NFR BLD AUTO: 0.4 % — SIGNIFICANT CHANGE UP (ref 0–2)
BILIRUB SERPL-MCNC: 0.2 MG/DL — SIGNIFICANT CHANGE UP (ref 0.2–1.2)
BUN SERPL-MCNC: 18 MG/DL — SIGNIFICANT CHANGE UP (ref 7–23)
CALCIUM SERPL-MCNC: 9.2 MG/DL — SIGNIFICANT CHANGE UP (ref 8.5–10.1)
CHLORIDE SERPL-SCNC: 108 MMOL/L — SIGNIFICANT CHANGE UP (ref 96–108)
CO2 SERPL-SCNC: 28 MMOL/L — SIGNIFICANT CHANGE UP (ref 22–31)
CREAT SERPL-MCNC: 0.91 MG/DL — SIGNIFICANT CHANGE UP (ref 0.5–1.3)
EGFR: 71 ML/MIN/1.73M2 — SIGNIFICANT CHANGE UP
EOSINOPHIL # BLD AUTO: 0.11 K/UL — SIGNIFICANT CHANGE UP (ref 0–0.5)
EOSINOPHIL NFR BLD AUTO: 2 % — SIGNIFICANT CHANGE UP (ref 0–6)
FLUAV AG NPH QL: SIGNIFICANT CHANGE UP
FLUBV AG NPH QL: SIGNIFICANT CHANGE UP
GLUCOSE SERPL-MCNC: 108 MG/DL — HIGH (ref 70–99)
HCT VFR BLD CALC: 35.4 % — SIGNIFICANT CHANGE UP (ref 34.5–45)
HGB BLD-MCNC: 11.9 G/DL — SIGNIFICANT CHANGE UP (ref 11.5–15.5)
IMM GRANULOCYTES NFR BLD AUTO: 0.4 % — SIGNIFICANT CHANGE UP (ref 0–0.9)
INR BLD: 0.99 RATIO — SIGNIFICANT CHANGE UP (ref 0.85–1.18)
LYMPHOCYTES # BLD AUTO: 1.04 K/UL — SIGNIFICANT CHANGE UP (ref 1–3.3)
LYMPHOCYTES # BLD AUTO: 18.4 % — SIGNIFICANT CHANGE UP (ref 13–44)
MAGNESIUM SERPL-MCNC: 2 MG/DL — SIGNIFICANT CHANGE UP (ref 1.6–2.6)
MCHC RBC-ENTMCNC: 31.4 PG — SIGNIFICANT CHANGE UP (ref 27–34)
MCHC RBC-ENTMCNC: 33.6 GM/DL — SIGNIFICANT CHANGE UP (ref 32–36)
MCV RBC AUTO: 93.4 FL — SIGNIFICANT CHANGE UP (ref 80–100)
MONOCYTES # BLD AUTO: 0.79 K/UL — SIGNIFICANT CHANGE UP (ref 0–0.9)
MONOCYTES NFR BLD AUTO: 14 % — SIGNIFICANT CHANGE UP (ref 2–14)
NEUTROPHILS # BLD AUTO: 3.66 K/UL — SIGNIFICANT CHANGE UP (ref 1.8–7.4)
NEUTROPHILS NFR BLD AUTO: 64.8 % — SIGNIFICANT CHANGE UP (ref 43–77)
NRBC # BLD: 0 /100 WBCS — SIGNIFICANT CHANGE UP (ref 0–0)
NT-PROBNP SERPL-SCNC: 56 PG/ML — SIGNIFICANT CHANGE UP (ref 0–125)
PLATELET # BLD AUTO: 170 K/UL — SIGNIFICANT CHANGE UP (ref 150–400)
POTASSIUM SERPL-MCNC: 4.2 MMOL/L — SIGNIFICANT CHANGE UP (ref 3.5–5.3)
POTASSIUM SERPL-SCNC: 4.2 MMOL/L — SIGNIFICANT CHANGE UP (ref 3.5–5.3)
PROT SERPL-MCNC: 7.3 G/DL — SIGNIFICANT CHANGE UP (ref 6–8.3)
PROTHROM AB SERPL-ACNC: 11.6 SEC — SIGNIFICANT CHANGE UP (ref 9.5–13)
RBC # BLD: 3.79 M/UL — LOW (ref 3.8–5.2)
RBC # FLD: 13.5 % — SIGNIFICANT CHANGE UP (ref 10.3–14.5)
RSV RNA NPH QL NAA+NON-PROBE: SIGNIFICANT CHANGE UP
SARS-COV-2 RNA SPEC QL NAA+PROBE: DETECTED
SODIUM SERPL-SCNC: 142 MMOL/L — SIGNIFICANT CHANGE UP (ref 135–145)
TROPONIN I, HIGH SENSITIVITY RESULT: 4.8 NG/L — SIGNIFICANT CHANGE UP
WBC # BLD: 5.64 K/UL — SIGNIFICANT CHANGE UP (ref 3.8–10.5)
WBC # FLD AUTO: 5.64 K/UL — SIGNIFICANT CHANGE UP (ref 3.8–10.5)

## 2024-04-29 PROCEDURE — 71045 X-RAY EXAM CHEST 1 VIEW: CPT

## 2024-04-29 PROCEDURE — 84484 ASSAY OF TROPONIN QUANT: CPT

## 2024-04-29 PROCEDURE — 85730 THROMBOPLASTIN TIME PARTIAL: CPT

## 2024-04-29 PROCEDURE — 83880 ASSAY OF NATRIURETIC PEPTIDE: CPT

## 2024-04-29 PROCEDURE — 96374 THER/PROPH/DIAG INJ IV PUSH: CPT

## 2024-04-29 PROCEDURE — 94640 AIRWAY INHALATION TREATMENT: CPT

## 2024-04-29 PROCEDURE — 71250 CT THORAX DX C-: CPT | Mod: 26,MC

## 2024-04-29 PROCEDURE — 71250 CT THORAX DX C-: CPT | Mod: MC

## 2024-04-29 PROCEDURE — 93010 ELECTROCARDIOGRAM REPORT: CPT

## 2024-04-29 PROCEDURE — 87637 SARSCOV2&INF A&B&RSV AMP PRB: CPT

## 2024-04-29 PROCEDURE — 85025 COMPLETE CBC W/AUTO DIFF WBC: CPT

## 2024-04-29 PROCEDURE — 85610 PROTHROMBIN TIME: CPT

## 2024-04-29 PROCEDURE — 99284 EMERGENCY DEPT VISIT MOD MDM: CPT | Mod: 25

## 2024-04-29 PROCEDURE — 93005 ELECTROCARDIOGRAM TRACING: CPT

## 2024-04-29 PROCEDURE — 71045 X-RAY EXAM CHEST 1 VIEW: CPT | Mod: 26

## 2024-04-29 PROCEDURE — 36415 COLL VENOUS BLD VENIPUNCTURE: CPT

## 2024-04-29 PROCEDURE — 80053 COMPREHEN METABOLIC PANEL: CPT

## 2024-04-29 PROCEDURE — 83735 ASSAY OF MAGNESIUM: CPT

## 2024-04-29 PROCEDURE — 99284 EMERGENCY DEPT VISIT MOD MDM: CPT

## 2024-04-29 RX ORDER — IPRATROPIUM/ALBUTEROL SULFATE 18-103MCG
3 AEROSOL WITH ADAPTER (GRAM) INHALATION ONCE
Refills: 0 | Status: COMPLETED | OUTPATIENT
Start: 2024-04-29 | End: 2024-04-29

## 2024-04-29 RX ORDER — SODIUM CHLORIDE 9 MG/ML
1000 INJECTION INTRAMUSCULAR; INTRAVENOUS; SUBCUTANEOUS ONCE
Refills: 0 | Status: COMPLETED | OUTPATIENT
Start: 2024-04-29 | End: 2024-04-29

## 2024-04-29 RX ADMIN — SODIUM CHLORIDE 1000 MILLILITER(S): 9 INJECTION INTRAMUSCULAR; INTRAVENOUS; SUBCUTANEOUS at 17:08

## 2024-04-29 RX ADMIN — Medication 3 MILLILITER(S): at 17:08

## 2024-04-29 RX ADMIN — Medication 125 MILLIGRAM(S): at 17:08

## 2024-04-29 NOTE — ED PROVIDER NOTE - PROGRESS NOTE DETAILS
Subhash Pastrana MD (Attending Physician): Pt tested positive for covid. Ambulatory pulse ox 97%. Other labs and imaging non-actionable. Pt was re-evaluated at bedside, VSS, feeling better overall. Results were discussed with patient as well as return precautions and follow up plan with PCP. Will send prescription for a few days of prednisone. Time was taken to answer any questions that the patient had before providing them with discharge paperwork.

## 2024-04-29 NOTE — ED PROVIDER NOTE - OBJECTIVE STATEMENT
The patient is a 63y Female with pmhx of asthma and alcohol abuse BIBEMS from [facility] for intermittent shortness of breath over the last couple of days. Pt tested positive for COVID earlier today. Pt has been using her rescue inhaler 4-5 times per day according to the staff. Says she currently feels fine. Denies SOB at this time. Pt is being treated at [facility] for etoh detox. Today, pt states she was still feeling SOB after taking her usual asthma meds. Endorsing a dry cough as well. Denies fever, chest pain, n/v/d, leg swelling, abd pain, urinary symptoms. Last drink 1 week ago.

## 2024-04-29 NOTE — ED PROVIDER NOTE - PATIENT PORTAL LINK FT
You can access the FollowMyHealth Patient Portal offered by [facility] by registering at the following website: [email or web address]. By joining [employer]’s FollowMyHealth portal, you will also be able to view your health information using other applications (apps) compatible with our system.

## 2024-04-29 NOTE — ED ADULT NURSE NOTE - CHIEF COMPLAINT QUOTE
Pt BIBA from [facility] for intermittent shortness of breath over last couple of days, +COVID as of today. Pt has been using her rescue inhaler 4-5x/ day as per staff. Hx asthma. Denies SOB at this time.   Being treated at [facility] for etoh detox. Calm and cooperative at this time.

## 2024-04-29 NOTE — ED PROVIDER NOTE - CLINICAL SUMMARY MEDICAL DECISION MAKING FREE TEXT BOX
Subhash Pastrana MD (Attending Physician): The patient is a 63y Female with pmhx of asthma and alcohol abuse BIBEMS from [facility] for intermittent shortness of breath over the last couple of days. DDx includes, but not limited to: covid, other viral syndrome, PNA, asthma exacerbation, ACS, PTX. Low suspicion for acute alcohol withdrawal at this time. ekg, cxr, CT chest, labs, duoneb, solu-medrol, IVF. Dispo pending w/u.

## 2024-04-29 NOTE — ED PROVIDER NOTE - NSFOLLOWUPINSTRUCTIONS_ED_ALL_ED_FT
Please have the patient take 40mg of prednisone for 5 days.    How to Recover from COVID-19 at Home    WHAT YOU NEED TO KNOW:    What do I need to know about recovery from COVID-19 at home? COVID-19 can cause a range of symptoms, from mild to severe. If you do not need to be treated in a hospital, you will be given instructions to use at home. You will need to watch for worsening symptoms and seek immediate care if needed. You will also need to stay physically apart from others so you do not spread the virus to anyone. It is not known if a person can be infected with the virus again after recovering from COVID-19. It is also not known if or for how long the virus can continue to be passed to others.    What can I do to manage my symptoms? Mild symptoms may get better on their own. The following may be used to manage your symptoms:    Decongestants help reduce nasal congestion and help you breathe more easily. If you take decongestant pills, they may make you feel restless or cause problems with your sleep. Do not use decongestant sprays for more than a few days.    Cough suppressants help reduce coughing. Ask your healthcare provider which type of cough medicine is best for you.    To soothe a sore throat, gargle with warm salt water, or use throat lozenges or a throat spray. Drink more liquids to thin and loosen mucus and to prevent dehydration. Use decongestants or saline drops as directed for nasal congestion.    NSAIDs, such as ibuprofen, help decrease swelling, pain, and fever. NSAIDs can cause stomach bleeding or kidney problems in certain people. If you take blood thinner medicine, always ask your healthcare provider if NSAIDs are safe for you. Always read the medicine label and follow directions.    Acetaminophen decreases pain and fever. It is available without a doctor's order. Ask how much to take and how often to take it. Follow directions. Read the labels of all other medicines you are using to see if they also contain acetaminophen, or ask your doctor or pharmacist. Acetaminophen can cause liver damage if not taken correctly.    Antiviral medicines may be given if you are at risk for developing severe or life-threatening symptoms.  How can I keep others safe while I am recovering at home? Healthcare providers will give you specific instructions to follow. The following are general guidelines to remind you how to keep others safe until you are well:    Wash your hands often. Use soap and water as much as possible. You can use hand  that contains alcohol if soap and water are not available. Do not share towels with anyone. If you use paper towels, throw them away in a lined trash can kept in your room or area. Use a covered trash can, if possible.  Handwashing      Cover sneezes and coughs. Turn your face away and cover your mouth and nose with a tissue. Throw the tissue away. Use the bend of your arm if a tissue is not available. Then wash your hands well with soap and water or use hand .    Wear a face covering (mask) around others. Use a cloth covering with at least 2 layers. You can also create layers by putting a cloth covering over a disposable non-medical mask. Cover your mouth and your nose. The covering should fit snugly against the bridge of your nose. Securely fasten it under your chin and on the sides of your face.  How to Wear a Face Covering (Mask)      Do not go out of your home unless it is necessary. If possible, ask someone who is not infected to go out for groceries, medicines, and household items. Ask your healthcare provider for other ways to have appointments. Some providers offer phone, video, or other types of appointments. If you need to be seen in person, call ahead to make sure the office will be ready for you.    Do not let anyone into your home, room, or area unless it is necessary. If possible, stay in a separate area or room of your home if you live with others. No one should go into the area or room except to give you care. Wear a face covering around others. Remind others to wear face coverings and to wash their hands.    Talk to your healthcare provider about your baby. If possible, ask someone who is well to care for your baby. You can put breast milk in bottles for the person to use, if needed. Wear a clean face covering if you need to breastfeed or express or pump breast milk. Tell your provider if you have any questions or concerns about caring for or bonding with your baby. He or she will tell you when to bring your baby in for check-ups and vaccines. He or she will also tell you what to do if you think your baby was infected with the coronavirus.    Follow directions from your healthcare provider for when you can be around others. Your provider will give you specific instructions. In general, you will need to wait 5 to 10 days after symptoms started or you got a positive test result. Wait even if you got a COVID-19 vaccine and 1 or 2 boosters. You can still be infected after you are vaccinated. You may spread the virus to others without knowing you are infected. Do not travel until your provider says it is okay.  Prevent COVID-19 Infection  Where can I find more information?    Centers for Disease Control and Prevention  [address] [place] [address]  Phone: 1-598.532.6857  Web Address: http://www.cdc.gov  Call your local emergency number (911 in the [place]) if:    You have trouble breathing or shortness of breath at rest.    You have chest pain or pressure that lasts longer than 5 minutes.    You become confused or hard to wake.  When should I seek immediate care?    The skin on your face, fingers, or toes look blue or darker than usual.    When should I call my doctor?    You have a fever.    You have new, returning, or worsening symptoms.    Someone in your home has symptoms of COVID-19.    You have questions or concerns about your condition or care.  CARE AGREEMENT:    You have the right to help plan your care. Learn about your health condition and how it may be treated. Discuss treatment options with your healthcare providers to decide what care you want to receive. You always have the right to refuse treatment.    Asthma    Asthma is a condition in which the airways tighten and narrow, making it difficult to breath. Asthma episodes, also called asthma attacks, range from minor to life-threatening. Symptoms include wheezing, coughing, chest tightness, or shortness of breath. The diagnosis of asthma is made by a review of your medical history and a physical exam, but may involve additional testing. Asthma cannot be cured, but medicines and lifestyle changes can help control it. Avoid triggers of asthma which may include animal dander, pollen, mold, smoke, air pollutants, etc.     SEEK IMMEDIATE MEDICAL CARE IF YOU HAVE ANY OF THE FOLLOWING SYMPTOMS: worsening of symptoms, shortness of breath at rest, chest pain, bluish discoloration to lips or fingertips, lightheadedness/dizziness, or fever.

## 2024-04-29 NOTE — ED ADULT TRIAGE NOTE - CHIEF COMPLAINT QUOTE
Pt BIBA from [facility] for intermittent shortness of breath over last couple of days, +COVID as of today. Pt has been using her rescue inhaler 4-5x/ day as per staff. Hx asthma. Denies SOB at this time.   Being treated at [facility] for etoh detox. Calm and cooperative at this time.

## 2024-04-29 NOTE — ED ADULT NURSE NOTE - OBJECTIVE STATEMENT
pt sent by [facility] from detox program for positive covid- 19test and sob. pt has hx of asthma requiring meds 3x a day. today pt states she was still feeling SOB after her usual meds. denies chest pain/fever/n/v/d. not requiring oxygen. pt has dry cough. pt safety maintained. pt currently detoxing from ETOH. CIWA 0. last drink 1 week ago.

## 2024-04-29 NOTE — ED PROVIDER NOTE - PHYSICAL EXAMINATION
GEN - NAD, well appearing, A&Ox3  HEAD - NC/AT  EYES - PERRL, EOMI  ENT - Airway patent, mucous membranes moist, oropharynx wnl. No tongue fasciculations.  PULMONARY - CTA b/l, symmetric breath sounds, no W/R/R, satting 98% on RA  CARDIAC - +S1S2, RRR, no M/G/R, no JVD  ABDOMEN - +BS, ND, NT, soft, no guarding, no rebound, no masses, no rigidity   - No CVA TTP b/l  EXTREMITIES - FROM, symmetric pulses, no edema  SKIN - No rash or bruising  NEUROLOGIC - Alert, speech clear, no focal deficits. No extremity tremors.  PSYCH - Normal mood/affect, normal insight
